# Patient Record
Sex: MALE | Race: WHITE | Employment: OTHER | URBAN - METROPOLITAN AREA
[De-identification: names, ages, dates, MRNs, and addresses within clinical notes are randomized per-mention and may not be internally consistent; named-entity substitution may affect disease eponyms.]

---

## 2025-05-21 ENCOUNTER — OFFICE VISIT (OUTPATIENT)
Age: 80
End: 2025-05-21
Payer: MEDICARE

## 2025-05-21 DIAGNOSIS — Z81.8 FAMILY HISTORY OF DEMENTIA: ICD-10-CM

## 2025-05-21 DIAGNOSIS — R41.89 COGNITIVE DECLINE: ICD-10-CM

## 2025-05-21 DIAGNOSIS — G31.84 MILD COGNITIVE IMPAIRMENT: Primary | ICD-10-CM

## 2025-05-21 PROCEDURE — 90791 PSYCH DIAGNOSTIC EVALUATION: CPT | Performed by: CLINICAL NEUROPSYCHOLOGIST

## 2025-05-21 NOTE — PROGRESS NOTES
[facility]  [facility]  NEUROLOGY [facility]INIC   [address]   [place] [address]   [phone] Office   [phone] Fax      Neuropsychology    Initial Diagnostic Interview Note      Referral:  Antelmo Rowell MD    Devonte Gee Jr. is a 79 y.o. left handed   male who was accompanied by his spouse to the initial clinical interview on 5/21/2025.  Please refer to his medical records for details pertaining to his history.   At the start of the appointment, I reviewed the patient's [facility] Epic Chart (including Media scanned in from previous providers) for the active Problem List, all pertinent Past Medical Hx, medications, recent radiologic and laboratory findings.  In addition, I reviewed pt's documented Immunization Record and Encounter History.     Chief Complaint: New patient, establish care, Neurocognitive and psychologic concerns    Followed by ortho.  He has done PT.      He completed college and had to repeat the first grade due to maturity.  No other academic issues.  Retired.  Was  for [employer] for [employer]. Retired.  2 years ago he was walking out to his paper box on his driveway and as he was walking he felt the pace of his steps picking up unexpectedly. Got the paper and walked back and the faster his pace got and he lost balance and fell and hit head on a flower pot that was there.  He got up and his wife took him to [facility].  Saw Dr. Yoo there of Neurosurgery.  A bleed was found in the brain and was admitted overnight.  Repeat imaging next day showed improvement so discharged without major intervention.  No other major known neurologic issues.    Memory problems began about a year ago and have been getting worse.  He forgets conversations. Misplaces things. Starts tasks and does not complete. Struggles remembering names. His penmanship has declined.  He would lean to the left when

## 2025-06-02 ENCOUNTER — PROCEDURE VISIT (OUTPATIENT)
Age: 80
End: 2025-06-02
Payer: MEDICARE

## 2025-06-02 DIAGNOSIS — G31.84 MILD COGNITIVE IMPAIRMENT: Primary | ICD-10-CM

## 2025-06-02 DIAGNOSIS — F90.0 ATTENTION DEFICIT HYPERACTIVITY DISORDER (ADHD), INATTENTIVE TYPE, MODERATE: Chronic | ICD-10-CM

## 2025-06-02 DIAGNOSIS — Z81.8 FAMILY HISTORY OF DEMENTIA: ICD-10-CM

## 2025-06-02 PROCEDURE — 96132 NRPSYC TST EVAL PHYS/QHP 1ST: CPT | Performed by: CLINICAL NEUROPSYCHOLOGIST

## 2025-06-02 PROCEDURE — 96138 PSYCL/NRPSYC TECH 1ST: CPT | Performed by: CLINICAL NEUROPSYCHOLOGIST

## 2025-06-02 PROCEDURE — 96139 PSYCL/NRPSYC TST TECH EA: CPT | Performed by: CLINICAL NEUROPSYCHOLOGIST

## 2025-06-05 NOTE — PROGRESS NOTES
future.  A follow up Neuropsychological Evaluation is indicated on a prn basis.      DIAGNOSES: The Referral Dx of [facility] - IS NOT SUPPORTED    Chronic ADHD, Inattentive     The above information is based upon information currently available to me.  If there is any additional information of which I am currently unaware, I would be more than happy to review it upon having it made available to me.  Thank you for the opportunity to see this interesting individual.     Sincerely,       Carmenza Aquino PsyD, EdS      Attachments:  IQ Test Results (In Media Section Of This EMR)    Cc: Antelmo Rowell MD    Time Documentation:      96138 x 1  96139 x 5 Test Administration/Data Gathering By Technician: (3 hours). 96138 x 1 (first 30 minutes), 96138 x 5 (each additional 30 minutes)    96132 x 1  96133 x 2 Testing Evaluation Services by Neuropsychologist (2 hour, 45 minutes) 96132 x 1 (first hour), 96133 x 2 (1 hour, 45 minutes)    Definitions:      71881/07691: Neuropsychological Test Administration by Technician/Psychometrist, first 30 minutes and each additional 30 minutes.     47089/16266:  Neurobehavioral Status Exam, Clinical interview.  Clinical assessment of thinking, reasoning and judgment, by neuropsychologist, both face to face time with patient and time interpreting those test results and reporting, first and subsequent hours. Record review.  Review of history provided by patient.  Review of collaborative information.  Testing by Clinician.  Review of raw data. Scoring. Report writing of individual tests administered by Clinician.  Integration of individual tests administered by psychometrist with NSE/testing by clinician, review of records/history/collaborative information, case Conceptualization, treatment planning, clinical decision making, report writing, coordination Of Care. Psychometry test codes as time spent by psychometrist administering and scoring neurocognitive/psychological tests under

## 2025-07-15 ENCOUNTER — TELEMEDICINE (OUTPATIENT)
Age: 80
End: 2025-07-15
Payer: MEDICARE

## 2025-07-15 DIAGNOSIS — G31.84 MILD COGNITIVE IMPAIRMENT: Primary | ICD-10-CM

## 2025-07-15 DIAGNOSIS — F90.0 ATTENTION DEFICIT HYPERACTIVITY DISORDER (ADHD), INATTENTIVE TYPE, MODERATE: ICD-10-CM

## 2025-07-15 PROCEDURE — 96132 NRPSYC TST EVAL PHYS/QHP 1ST: CPT | Performed by: CLINICAL NEUROPSYCHOLOGIST

## 2025-07-15 NOTE — PROGRESS NOTES
Devonte Gee Jr., was evaluated through a synchronous (real-time) audio-video encounter. The patient (or guardian if applicable) is aware that this is a billable service, which includes applicable co-pays. This Virtual Visit was conducted with patient's (and/or legal guardian's) consent. Patient identification was verified, and a caregiver was present when appropriate.   The patient was located at Home: [address] [place] [address] [address]  [facility] [id number]  Provider was located at Facility (Appt Dept): [address] [place] [address] [address]  [place] [address]  Confirm you are appropriately licensed, registered, or certified to deliver care in the state where the patient is located as indicated above. If you are not or unsure, please re-schedule the visit: Yes, I confirm.     Devonte Gee Jr. (:  1945) is a Established patient, presenting virtually for evaluation of the following:        Chief Complaint:  Follow up to discuss test results with this established patient related to neurocognitive and psychologic functioning, cognitive concerns and emotional/mood concerns as outlined below.     A neuropsychological evaluation was completed with the patient on 2025     Prior to seeing the patient for today's visit, I reviewed pertinent records, including the previously completed report, the records in Epic, and any updated visits from other providers since the patient's last visit.     During today's appointment I administered the following measures: NMSE, Fluency.  Exam normal          I provided feedback services related to the previously completed report. Attendees included: Patient in office, daughter virtually. Education was provided regarding my diagnostic impressions, and we discussed treatment plan/options. Attendees were provided with the opportunity to ask questions, which were answered to the best of my ability.     We discussed, in detail, the following:      This patient generated an